# Patient Record
Sex: MALE | NOT HISPANIC OR LATINO | URBAN - METROPOLITAN AREA
[De-identification: names, ages, dates, MRNs, and addresses within clinical notes are randomized per-mention and may not be internally consistent; named-entity substitution may affect disease eponyms.]

---

## 2024-06-04 ENCOUNTER — LAB OUTSIDE AN ENCOUNTER (OUTPATIENT)
Dept: URBAN - METROPOLITAN AREA CLINIC 19 | Facility: CLINIC | Age: 84
End: 2024-06-04

## 2024-06-04 ENCOUNTER — OFFICE VISIT (OUTPATIENT)
Dept: URBAN - METROPOLITAN AREA CLINIC 19 | Facility: CLINIC | Age: 84
End: 2024-06-04
Payer: MEDICARE

## 2024-06-04 VITALS
HEART RATE: 71 BPM | HEIGHT: 72 IN | DIASTOLIC BLOOD PRESSURE: 69 MMHG | SYSTOLIC BLOOD PRESSURE: 128 MMHG | WEIGHT: 159.4 LBS | OXYGEN SATURATION: 96 % | TEMPERATURE: 97.3 F | BODY MASS INDEX: 21.59 KG/M2

## 2024-06-04 DIAGNOSIS — R19.4 CHANGE IN BOWEL HABIT: ICD-10-CM

## 2024-06-04 DIAGNOSIS — K92.1 BLOOD IN STOOL: ICD-10-CM

## 2024-06-04 DIAGNOSIS — Z12.11 SCREEN FOR COLON CANCER: ICD-10-CM

## 2024-06-04 PROCEDURE — 99204 OFFICE O/P NEW MOD 45 MIN: CPT | Performed by: NURSE PRACTITIONER

## 2024-06-04 RX ORDER — ROSUVASTATIN CALCIUM 20 MG/1
1 TABLET TABLET, COATED ORAL ONCE A DAY
Status: ACTIVE | COMMUNITY

## 2024-06-04 RX ORDER — ASPIRIN 81 MG/1
1 TABLET TABLET, CHEWABLE ORAL ONCE A DAY
Status: ACTIVE | COMMUNITY

## 2024-06-04 NOTE — HPI-TODAY'S VISIT:
Mr. Zuniga is an 83-year-old male with PMH of CAD s/p stents (most recent 6mo ago), bypass surgery, Afib s/p Watchman's on Effient and baby asa who presents today for complaints of blood in stool.  Reports change in bowel habits. Stools are mushy over the past 45 days or so. Was having normal BMs prior to this.  He reports bright red blood in stool in toilet. Lasted about a month and with every BM. No bleeding last few days. Last colonoscopy with Dr. Gurrola over 10 yrs ago, reports normal. No known family hx of colon cancer.  He does feel weaker than usual. Very active riding his bike daily but seems a bit more difficult lately, legs feel weaker. No chest pain, palpitations, or dyspnea. Will complete his blood thinner in about 40 days but keep baby aspirin.

## 2024-06-05 LAB
FERRITIN, SERUM: 156
HEMATOCRIT: 39.3
HEMOGLOBIN: 13
IRON BIND.CAP.(TIBC): 292
IRON SATURATION: 25
IRON: 74
MCH: 30.1
MCHC: 33.1
MCV: 91
MPV: 9.4
PLATELET COUNT: 156
RDW: 13.3
RED BLOOD CELL COUNT: 4.32
WHITE BLOOD CELL COUNT: 5.6

## 2024-06-11 LAB
CALPROTECTIN, FECAL: 121
CLOSTRIDIUM DIFFICILE: (no result)

## 2024-07-19 ENCOUNTER — HOSPITAL ENCOUNTER (OUTPATIENT)
Age: 84
End: 2024-07-19
Payer: MEDICARE

## 2024-07-19 DIAGNOSIS — J02.9: Primary | ICD-10-CM

## 2024-07-19 PROCEDURE — 87070 CULTURE OTHR SPECIMN AEROBIC: CPT

## 2024-08-08 ENCOUNTER — OFFICE VISIT (OUTPATIENT)
Dept: URBAN - METROPOLITAN AREA SURGERY CENTER 31 | Facility: SURGERY CENTER | Age: 84
End: 2024-08-08